# Patient Record
Sex: MALE | Race: WHITE | NOT HISPANIC OR LATINO | URBAN - METROPOLITAN AREA
[De-identification: names, ages, dates, MRNs, and addresses within clinical notes are randomized per-mention and may not be internally consistent; named-entity substitution may affect disease eponyms.]

---

## 2017-02-01 ENCOUNTER — EMERGENCY (EMERGENCY)
Facility: HOSPITAL | Age: 52
LOS: 0 days | Discharge: HOME | End: 2017-02-01

## 2017-06-27 DIAGNOSIS — K57.32 DIVERTICULITIS OF LARGE INTESTINE WITHOUT PERFORATION OR ABSCESS WITHOUT BLEEDING: ICD-10-CM

## 2017-06-27 DIAGNOSIS — Z88.0 ALLERGY STATUS TO PENICILLIN: ICD-10-CM

## 2017-06-27 DIAGNOSIS — E11.9 TYPE 2 DIABETES MELLITUS WITHOUT COMPLICATIONS: ICD-10-CM

## 2017-06-27 DIAGNOSIS — R19.7 DIARRHEA, UNSPECIFIED: ICD-10-CM

## 2017-06-27 DIAGNOSIS — I10 ESSENTIAL (PRIMARY) HYPERTENSION: ICD-10-CM

## 2017-06-27 DIAGNOSIS — Z88.5 ALLERGY STATUS TO NARCOTIC AGENT: ICD-10-CM

## 2019-01-07 ENCOUNTER — OUTPATIENT (OUTPATIENT)
Dept: OUTPATIENT SERVICES | Facility: HOSPITAL | Age: 54
LOS: 1 days | Discharge: HOME | End: 2019-01-07

## 2019-01-08 DIAGNOSIS — G47.33 OBSTRUCTIVE SLEEP APNEA (ADULT) (PEDIATRIC): ICD-10-CM

## 2019-01-14 ENCOUNTER — EMERGENCY (EMERGENCY)
Facility: HOSPITAL | Age: 54
LOS: 0 days | Discharge: HOME | End: 2019-01-14
Attending: EMERGENCY MEDICINE | Admitting: EMERGENCY MEDICINE

## 2019-01-14 VITALS
DIASTOLIC BLOOD PRESSURE: 87 MMHG | HEART RATE: 77 BPM | WEIGHT: 279.99 LBS | SYSTOLIC BLOOD PRESSURE: 146 MMHG | RESPIRATION RATE: 18 BRPM

## 2019-01-14 VITALS — TEMPERATURE: 98 F

## 2019-01-14 DIAGNOSIS — Z79.899 OTHER LONG TERM (CURRENT) DRUG THERAPY: ICD-10-CM

## 2019-01-14 DIAGNOSIS — Z87.19 PERSONAL HISTORY OF OTHER DISEASES OF THE DIGESTIVE SYSTEM: ICD-10-CM

## 2019-01-14 DIAGNOSIS — I10 ESSENTIAL (PRIMARY) HYPERTENSION: ICD-10-CM

## 2019-01-14 DIAGNOSIS — M54.5 LOW BACK PAIN: ICD-10-CM
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
## 2019-01-14 DIAGNOSIS — Z88.5 ALLERGY STATUS TO NARCOTIC AGENT: ICD-10-CM
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
## 2019-01-14 DIAGNOSIS — M54.9 DORSALGIA, UNSPECIFIED: ICD-10-CM
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
## 2019-01-14 DIAGNOSIS — Z88.0 ALLERGY STATUS TO PENICILLIN: ICD-10-CM

## 2019-01-14 RX ORDER — MELOXICAM 15 MG/1
0 TABLET ORAL
Qty: 0 | Refills: 0 | COMMUNITY

## 2019-01-14 RX ORDER — LOSARTAN POTASSIUM 100 MG/1
1 TABLET, FILM COATED ORAL
Qty: 0 | Refills: 0 | COMMUNITY

## 2019-01-14 RX ORDER — IBUPROFEN 200 MG
600 TABLET ORAL ONCE
Qty: 0 | Refills: 0 | Status: DISCONTINUED | OUTPATIENT
Start: 2019-01-14 | End: 2019-01-14

## 2019-01-14 RX ORDER — METHOCARBAMOL 500 MG/1
2 TABLET, FILM COATED ORAL
Qty: 42 | Refills: 0 | OUTPATIENT
Start: 2019-01-14 | End: 2019-01-20

## 2019-01-14 RX ORDER — METHOCARBAMOL 500 MG/1
1000 TABLET, FILM COATED ORAL ONCE
Qty: 0 | Refills: 0 | Status: DISCONTINUED | OUTPATIENT
Start: 2019-01-14 | End: 2019-01-14

## 2019-01-14 RX ORDER — ACETAMINOPHEN 500 MG
975 TABLET ORAL ONCE
Qty: 0 | Refills: 0 | Status: DISCONTINUED | OUTPATIENT
Start: 2019-01-14 | End: 2019-01-14

## 2019-01-14 RX ORDER — CYCLOBENZAPRINE HYDROCHLORIDE 10 MG/1
0 TABLET, FILM COATED ORAL
Qty: 0 | Refills: 0 | COMMUNITY

## 2019-01-14 NOTE — ED PROVIDER NOTE - MEDICAL DECISION MAKING DETAILS
pt with history of sciatica.  pt with exacerbation of low right sided back pain radiating down right leg since sleeping on the couch.  no cp, no sob, no upper back pain, no numbness, no weakness, no urinary complaints, no urinary incontinence.  pt given rx for muscle relaxer.  pt informed to stop taking the flexeril.  pt to follow up in rehab clinic.  pt understands importance of follow up and likely need for mri as outpatient.  pt comfortable with plan.

## 2019-01-14 NOTE — ED PROVIDER NOTE - PROGRESS NOTE DETAILS
according to chart  VS flow sheet resp were 18 and oxygen was 100% (this must have been O2 sat and was incorrectly placed in the wrong section of flow sheet)-  PT WAS NOT ON OXYGEN THERAPY

## 2019-01-14 NOTE — ED PROVIDER NOTE - NSFOLLOWUPCLINICS_GEN_ALL_ED_FT
[facility]  Rehabilitation  [address] [place] [address]  Phone: [phone]  Fax:   Follow Up Time:

## 2019-01-14 NOTE — ED PROVIDER NOTE - PHYSICAL EXAMINATION
GENERAL: Well-nourished, Well-developed. NAD.  CVS: Normal S1,S2. No murmurs appreciated on auscultation   RESP: Chest rise symmetrical with good expansion. Lungs clear to auscultation B/L. No wheezing, rales, or rhonchi auscultated.  MSK: + TTP to right sciatic notch FROM of upper and lower extremities B/L   Skin: Warm, Dry. No rashes or lesions   EXT: Radial and pedal pulses present B/L.   Neuro: AA&O x 3. CNs II-XII grossly intact . Sensation grossly intact. Strength 5/5 B/L. Gait within normal limits.   Psych: Appropriate mood and affect

## 2019-01-14 NOTE — ED ADULT NURSE NOTE - NSIMPLEMENTINTERV_GEN_ALL_ED
Implemented All Universal Safety Interventions:  [place] to call system. Call bell, personal items and telephone within reach. Instruct patient to call for assistance. Room bathroom lighting operational. Non-slip footwear when patient is off stretcher. Physically safe environment: no spills, clutter or unnecessary equipment. Stretcher in lowest position, wheels locked, appropriate side rails in place.

## 2019-01-14 NOTE — ED PROVIDER NOTE - ATTENDING CONTRIBUTION TO CARE
54 yo m who was diagnsoed with sciatica recently presents with exacerbation of low back pain and muscle spasm that occurred today after she slept wrong on the couch last night.  no numbness, no weakness.  no urinary complaints.  no urinary incontinence.  no abd pain, no nausea, no vomiting.  awake, alert.  No midline vertebral tenderness.  right lower back muscle tenderness.  motor/sensation intact in bilateral lower ext.  abd soft, nontender.  p:  pain control.  pt is allergic to codeine.  pt took nsaids just prior to arrival.  pt instructed to stop taking flexiril at home.  will try rx for robaxin.  pt understands and will stop flexeril.   pt to follow up with rehab clinic tomorrow and will make sure to get MRI as outpatient.  pt understands need for mri

## 2019-01-14 NOTE — ED PROVIDER NOTE - NS ED ROS FT
Constitutional: (-) fever (-) malaise (-) diaphoresis (-) chills   Cardiovascular: (-) chest pain, (-) syncope (-) palpitations   Respiratory: (-) dry/productive cough, (-) shortness of breath   Gastrointestinal: (-) N/V/D (-) abdominal pain   : (-) dysuria (-) hematuria (-) incontinence  Musculoskeletal: (+) back pain, (-) neck pain  Integumentary: (-) rash  Neurological: (-) headache, (-) dizziness (-) numbness/tingling/weakness to extremities

## 2019-01-14 NOTE — ED PROVIDER NOTE - OBJECTIVE STATEMENT
52 yo male with 54 yo male with PMH of diverticulitis, HTN, chronic back pain presents to the ED c/o right sided back pain radiating down right leg x 1 day.  Patient states he fell asleep on the couch last night, which aggravated his back pain. Patient took aleve around 12 pm today.  Patient denies fever, chills, chest pain, SOB, numbness/tingling/weakness to extremities, IVDA, hx of back surgery, incontinence, dyruria, and hematuria.

## 2019-01-14 NOTE — ED PROVIDER NOTE - NSFOLLOWUPINSTRUCTIONS_ED_ALL_ED_FT
Discontinue taking Flexeril at home.  Can take Motrin every 6-8 hours for pain and take Robaxin as directed.    Back Pain    Back pain is very common in adults. The cause of back pain is rarely dangerous and the pain often gets better over time. The cause of your back pain may not be known and may include strain of muscles or ligaments, degeneration of the spinal disks, or arthritis. Occasionally the pain may radiate down your leg(s). Over-the-counter medicines to reduce pain and inflammation are often the most helpful. Stretching and remaining active frequently helps the healing process.     SEEK IMMEDIATE MEDICAL CARE IF YOU HAVE ANY OF THE FOLLOWING SYMPTOMS: bowel or bladder control problems, unusual weakness or numbness in your arms or legs, nausea or vomiting, abdominal pain, fever, dizziness/lightheadedness.

## 2019-02-22 ENCOUNTER — OUTPATIENT (OUTPATIENT)
Dept: OUTPATIENT SERVICES | Facility: HOSPITAL | Age: 54
LOS: 1 days | Discharge: HOME | End: 2019-02-22

## 2019-02-25 DIAGNOSIS — G47.33 OBSTRUCTIVE SLEEP APNEA (ADULT) (PEDIATRIC): ICD-10-CM

## 2019-02-25 PROBLEM — I10 ESSENTIAL (PRIMARY) HYPERTENSION: Chronic | Status: ACTIVE | Noted: 2019-01-14

## 2022-05-04 PROBLEM — Z00.00 ENCOUNTER FOR PREVENTIVE HEALTH EXAMINATION: Status: ACTIVE | Noted: 2022-05-04

## 2022-05-05 ENCOUNTER — APPOINTMENT (OUTPATIENT)
Dept: OTOLARYNGOLOGY | Facility: CLINIC | Age: 57
End: 2022-05-05
Payer: COMMERCIAL

## 2022-05-05 VITALS — WEIGHT: 260 LBS | BODY MASS INDEX: 35.21 KG/M2 | HEIGHT: 72 IN

## 2022-05-05 DIAGNOSIS — G47.33 OBSTRUCTIVE SLEEP APNEA (ADULT) (PEDIATRIC): ICD-10-CM

## 2022-05-05 DIAGNOSIS — T16.2XXA FOREIGN BODY IN LEFT EAR, INITIAL ENCOUNTER: ICD-10-CM

## 2022-05-05 DIAGNOSIS — R42 DIZZINESS AND GIDDINESS: ICD-10-CM

## 2022-05-05 PROCEDURE — 92557 COMPREHENSIVE HEARING TEST: CPT

## 2022-05-05 PROCEDURE — 69200 CLEAR OUTER EAR CANAL: CPT | Mod: LT

## 2022-05-05 PROCEDURE — 99203 OFFICE O/P NEW LOW 30 MIN: CPT | Mod: 25

## 2022-05-05 NOTE — HISTORY OF PRESENT ILLNESS
[de-identified] : Patient presents today c/o FB in right ear.  He has no pain but when gets up or lays down the room is spinning. He had a recent URI[place]  [place]    He denies any discharge. Neighbor is an MD and looked in his ear, told he had a right ear FBD

## 2022-05-05 NOTE — ASSESSMENT
[FreeTextEntry1] : Likely post-viral BPV, resolving\par Foreign body removed from left ear\par REviewed options for OSMAN including CPAP/DISE followed by [facility], not interested in surgery\par Audio reviewed wnl

## 2022-05-05 NOTE — PHYSICAL EXAM
[Normal] : mucosa is normal [Midline] : trachea located in midline position [de-identified] : left ear fb (small hair) [] : Trafford-Hallpike test is negative

## 2023-09-12 ENCOUNTER — EMERGENCY (EMERGENCY)
Facility: HOSPITAL | Age: 58
LOS: 0 days | Discharge: HOME | End: 2023-09-12
Attending: EMERGENCY MEDICINE
Payer: COMMERCIAL

## 2023-09-12 VITALS
HEART RATE: 88 BPM | SYSTOLIC BLOOD PRESSURE: 160 MMHG | OXYGEN SATURATION: 100 % | DIASTOLIC BLOOD PRESSURE: 79 MMHG | TEMPERATURE: 98 F | WEIGHT: 255.07 LBS | HEIGHT: 72 IN | RESPIRATION RATE: 16 BRPM

## 2023-09-12 DIAGNOSIS — Z88.5 ALLERGY STATUS TO NARCOTIC AGENT: ICD-10-CM

## 2023-09-12 DIAGNOSIS — I10 ESSENTIAL (PRIMARY) HYPERTENSION: ICD-10-CM

## 2023-09-12 DIAGNOSIS — M54.89 OTHER DORSALGIA: ICD-10-CM
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
## 2023-09-12 DIAGNOSIS — Z88.0 ALLERGY STATUS TO PENICILLIN: ICD-10-CM

## 2023-09-12 PROCEDURE — 99284 EMERGENCY DEPT VISIT MOD MDM: CPT

## 2023-09-12 PROCEDURE — 99282 EMERGENCY DEPT VISIT SF MDM: CPT

## 2023-09-12 RX ORDER — ACETAMINOPHEN 500 MG
975 TABLET ORAL ONCE
Refills: 0 | Status: COMPLETED | OUTPATIENT
Start: 2023-09-12 | End: 2023-09-12

## 2023-09-12 RX ORDER — LIDOCAINE 4 G/100G
1 CREAM TOPICAL ONCE
Refills: 0 | Status: COMPLETED | OUTPATIENT
Start: 2023-09-12 | End: 2023-09-12

## 2023-09-12 RX ORDER — TIZANIDINE 4 MG/1
2 TABLET ORAL
Qty: 18 | Refills: 0
Start: 2023-09-12 | End: 2023-09-14

## 2023-09-12 RX ADMIN — LIDOCAINE 1 PATCH: 4 CREAM TOPICAL at 11:30

## 2023-09-12 RX ADMIN — Medication 975 MILLIGRAM(S): at 11:30
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
## 2023-09-12 NOTE — ED PROVIDER NOTE - PATIENT PORTAL LINK FT
You can access the FollowMyHealth Patient Portal offered by [facility] by registering at the following website: [email or web address]. By joining [facility]’s FollowMyHealth portal, you will also be able to view your health information using other applications (apps) compatible with our system.
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
## 2023-09-12 NOTE — ED PROVIDER NOTE - PHYSICAL EXAMINATION
Airway: Intact, Protecting airway  Breathing: Present b/l  Circulation: Strong pulses present in all four extremities    VITAL SIGNS: I have reviewed nursing notes and confirm.  CONSTITUTIONAL: no acute distress, GCS 15  SKIN: no ecchymosis, bruising, abrasions, or lacerations  HEAD/Face: NCAT, no tenderness to mandible, skull bones, or zygoma  EYES: EOMI, PERRLA, no scleral icterus. No orbital rim tenderness or proptosis  ENT: no sinus tenderness to percussion, no dental or tongue injury, normal pharynx. No signs of Basilar skull fracture.   NECK: Supple; no midline cervical tenderness. Mild right trapezius and paraspinal tenderness.  Spine: normal appearing spine with no step-off  CARD: RRR, no murmurs, rubs or gallops  RESP: clear to ausculation b/l.  No rales, rhonchi, or wheezing.  Chest: goods chest rise with breathing, no rib tenderness or crepitus  ABD: soft, + BS, non-tender, non-distended, no rebound or guarding. No CVA tenderness  BACK: No midline thoracic tenderness. + Tenderness to musculature of right upper back. No scapular tenderness.  Pelvis: Pelvis stable, no tenderness to pelvic bones, sacrum  EXT: Full ROM, no bony tenderness, no pedal edema  NEURO: normal motor. normal sensory. CN II-XII intact. Cerebellar testing normal. Normal gait.  PSYCH: Alert, cooperative, and appropriate.
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
## 2023-09-27 ENCOUNTER — APPOINTMENT (OUTPATIENT)
Dept: ORTHOPEDIC SURGERY | Facility: CLINIC | Age: 58
End: 2023-09-27
Payer: COMMERCIAL

## 2023-09-27 DIAGNOSIS — S13.9XXA SPRAIN OF JOINTS AND LIGAMENTS OF UNSPECIFIED PARTS OF NECK, INITIAL ENCOUNTER: ICD-10-CM

## 2023-09-27 DIAGNOSIS — S33.5XXA SPRAIN OF LIGAMENTS OF LUMBAR SPINE, INITIAL ENCOUNTER: ICD-10-CM

## 2023-09-27 PROCEDURE — 72050 X-RAY EXAM NECK SPINE 4/5VWS: CPT

## 2023-09-27 PROCEDURE — 99204 OFFICE O/P NEW MOD 45 MIN: CPT

## 2023-09-27 PROCEDURE — 72110 X-RAY EXAM L-2 SPINE 4/>VWS: CPT

## 2023-10-16 ENCOUNTER — APPOINTMENT (OUTPATIENT)
Dept: PAIN MANAGEMENT | Facility: CLINIC | Age: 58
End: 2023-10-16